# Patient Record
Sex: FEMALE | Race: WHITE | NOT HISPANIC OR LATINO | URBAN - METROPOLITAN AREA
[De-identification: names, ages, dates, MRNs, and addresses within clinical notes are randomized per-mention and may not be internally consistent; named-entity substitution may affect disease eponyms.]

---

## 2017-05-01 ENCOUNTER — APPOINTMENT (RX ONLY)
Dept: URBAN - METROPOLITAN AREA CLINIC 7 | Facility: CLINIC | Age: 52
Setting detail: DERMATOLOGY
End: 2017-05-01

## 2017-05-01 DIAGNOSIS — L98.8 OTHER SPECIFIED DISORDERS OF THE SKIN AND SUBCUTANEOUS TISSUE: ICD-10-CM

## 2017-05-01 PROBLEM — L90.8 OTHER ATROPHIC DISORDERS OF SKIN: Status: ACTIVE | Noted: 2017-05-01

## 2017-05-01 PROCEDURE — ? COSMETIC CONSULTATION: BOTULINUM TOXIN

## 2017-05-01 PROCEDURE — ? BOTOX

## 2017-05-01 ASSESSMENT — LOCATION DETAILED DESCRIPTION DERM: LOCATION DETAILED: GLABELLA

## 2017-05-01 ASSESSMENT — LOCATION SIMPLE DESCRIPTION DERM: LOCATION SIMPLE: GLABELLA

## 2017-05-01 ASSESSMENT — LOCATION ZONE DERM: LOCATION ZONE: FACE

## 2017-05-01 NOTE — PROCEDURE: BOTOX
Additional Area 3 Units: 0
Detail Level: Detailed
Glabellar Complex Units: 15
Dilution (U/0.1 Cc): 5
Lot #: [id number]
Reconstitution Date (Optional): 4/28/17
Post-Care Instructions: Patient instructed to not lie down for 4 hours and limit physical activity for 24 hours and do not massage treatment area. Patient instructed not to travel by airplane for 48 hours.
Consent: Written consent obtained. Risks include but not limited to lid/brow ptosis, bruising, swelling, diplopia, temporary effect, incomplete chemical denervation.
Price (Use Numbers Only, No Special Characters Or $): 735
Expiration Date (Month Year): DEC 2019

## 2018-04-20 ENCOUNTER — APPOINTMENT (RX ONLY)
Dept: URBAN - METROPOLITAN AREA CLINIC 7 | Facility: CLINIC | Age: 53
Setting detail: DERMATOLOGY
End: 2018-04-20

## 2018-04-20 DIAGNOSIS — Z41.9 ENCOUNTER FOR PROCEDURE FOR PURPOSES OTHER THAN REMEDYING HEALTH STATE, UNSPECIFIED: ICD-10-CM

## 2018-04-20 PROCEDURE — ? BOTOX

## 2018-04-20 PROCEDURE — ? COSMETIC CONSULTATION: BOTULINUM TOXIN

## 2018-04-20 NOTE — PROCEDURE: BOTOX
Additional Area 6 Units: 0
Detail Level: Detailed
Dilution (U/0.1 Cc): 5
Periorbital Skin Units: 10
Reconstitution Date (Optional): 04/19/18
Glabellar Complex Units: 20
Post-Care Instructions: Patient instructed to not lie down for 4 hours and limit physical activity for 24 hours and do not massage treatment area. Patient instructed not to travel by airplane for 48 hours.
Price (Use Numbers Only, No Special Characters Or $): 390.00
Lot #: [id number]
Expiration Date (Month Year): 10/2020
Consent: Written consent obtained. Risks include but not limited to lid/brow ptosis, bruising, swelling, diplopia, temporary effect, incomplete chemical denervation.